# Patient Record
Sex: FEMALE | Race: WHITE | NOT HISPANIC OR LATINO | URBAN - METROPOLITAN AREA
[De-identification: names, ages, dates, MRNs, and addresses within clinical notes are randomized per-mention and may not be internally consistent; named-entity substitution may affect disease eponyms.]

---

## 2017-04-18 ENCOUNTER — APPOINTMENT (RX ONLY)
Dept: URBAN - METROPOLITAN AREA CLINIC 127 | Facility: CLINIC | Age: 72
Setting detail: DERMATOLOGY
End: 2017-04-18

## 2017-04-18 DIAGNOSIS — L81.4 OTHER MELANIN HYPERPIGMENTATION: ICD-10-CM

## 2017-04-18 DIAGNOSIS — R21 RASH AND OTHER NONSPECIFIC SKIN ERUPTION: ICD-10-CM

## 2017-04-18 PROBLEM — I10 ESSENTIAL (PRIMARY) HYPERTENSION: Status: ACTIVE | Noted: 2017-04-18

## 2017-04-18 PROBLEM — F41.9 ANXIETY DISORDER, UNSPECIFIED: Status: ACTIVE | Noted: 2017-04-18

## 2017-04-18 PROBLEM — H91.90 UNSPECIFIED HEARING LOSS, UNSPECIFIED EAR: Status: ACTIVE | Noted: 2017-04-18

## 2017-04-18 PROBLEM — E78.5 HYPERLIPIDEMIA, UNSPECIFIED: Status: ACTIVE | Noted: 2017-04-18

## 2017-04-18 PROBLEM — L23.7 ALLERGIC CONTACT DERMATITIS DUE TO PLANTS, EXCEPT FOOD: Status: ACTIVE | Noted: 2017-04-18

## 2017-04-18 PROBLEM — M12.9 ARTHROPATHY, UNSPECIFIED: Status: ACTIVE | Noted: 2017-04-18

## 2017-04-18 PROCEDURE — 99202 OFFICE O/P NEW SF 15 MIN: CPT

## 2017-04-18 PROCEDURE — ? PRESCRIPTION

## 2017-04-18 PROCEDURE — ? COUNSELING

## 2017-04-18 ASSESSMENT — LOCATION ZONE DERM
LOCATION ZONE: FACE
LOCATION ZONE: TRUNK
LOCATION ZONE: LIP

## 2017-04-18 ASSESSMENT — LOCATION SIMPLE DESCRIPTION DERM
LOCATION SIMPLE: LEFT CHEEK
LOCATION SIMPLE: RIGHT UPPER BACK
LOCATION SIMPLE: LEFT LIP

## 2017-04-18 ASSESSMENT — LOCATION DETAILED DESCRIPTION DERM
LOCATION DETAILED: LEFT INFERIOR MEDIAL MALAR CHEEK
LOCATION DETAILED: RIGHT SUPERIOR UPPER BACK
LOCATION DETAILED: LEFT UPPER CUTANEOUS LIP

## 2017-04-18 NOTE — HPI: RASH
How Severe Is Your Rash?: mild
Is This A New Presentation, Or A Follow-Up?: Rash
Additional History: Pt is leaving in 2 days to return up [place] so will not be able to follow up. She has tried not using her makeup but that has not made a difference.

## 2017-04-18 NOTE — PROCEDURE: MIPS QUALITY
Quality 130: Documentation Of Current Medications In The Medical Record: Current Medications Documented
Quality 226: Preventive Care And Screening: Tobacco Use: Screening And Cessation Intervention: Patient screened for tobacco and never smoked
Quality 110: Preventive Care And Screening: Influenza Immunization: Influenza Immunization previously received during influenza season
Quality 131: Pain Assessment And Follow-Up: Pain assessment using a standardized tool is documented as negative, no follow-up plan required
Detail Level: Detailed
Quality 47: Advance Care Plan: Advance Care Planning discussed and documented in the medical record; patient did not wish or was not able to name a surrogate decision maker or provide an advance care plan.
Quality 111:Pneumonia Vaccination Status For Older Adults: Pneumococcal Vaccination Previously Received

## 2017-04-23 RX ORDER — IODOQUINOL, HYDROCORTISONE ACETATE AND ALOE VERA LEAF 10; 20; 10 MG/G; MG/G; MG/G
GEL TOPICAL
Qty: 1 | Refills: 0 | Status: ERX | COMMUNITY
Start: 2017-04-23

## 2017-04-23 RX ADMIN — IODOQUINOL, HYDROCORTISONE ACETATE AND ALOE VERA LEAF: 10; 20; 10 GEL TOPICAL at 16:06

## 2020-10-27 ENCOUNTER — OFFICE VISIT (OUTPATIENT)
Dept: URBAN - METROPOLITAN AREA CLINIC 68 | Facility: CLINIC | Age: 75
End: 2020-10-27

## 2020-11-09 ENCOUNTER — OFFICE VISIT (OUTPATIENT)
Dept: URBAN - METROPOLITAN AREA SURGERY CENTER 12 | Facility: SURGERY CENTER | Age: 75
End: 2020-11-09

## 2021-07-29 ENCOUNTER — OFFICE VISIT (OUTPATIENT)
Dept: URBAN - METROPOLITAN AREA CLINIC 68 | Facility: CLINIC | Age: 76
End: 2021-07-29

## 2021-08-10 ENCOUNTER — OFFICE VISIT (OUTPATIENT)
Dept: URBAN - METROPOLITAN AREA SURGERY CENTER 12 | Facility: SURGERY CENTER | Age: 76
End: 2021-08-10

## 2022-06-04 ENCOUNTER — TELEPHONE ENCOUNTER (OUTPATIENT)
Dept: URBAN - METROPOLITAN AREA CLINIC 68 | Facility: CLINIC | Age: 77
End: 2022-06-04

## 2022-06-04 RX ORDER — PITAVASTATIN CALCIUM 2.09 MG/1
LIVALO( 2MG ORAL  DAILY ) INACTIVE -HX ENTRY TABLET, FILM COATED ORAL DAILY
OUTPATIENT
Start: 2020-10-27

## 2022-06-04 RX ORDER — B-COMPLEX WITH VITAMIN C
VITAMIN B COMPLEX(  ORAL  DAILY ) INACTIVE -HX ENTRY TABLET ORAL DAILY
OUTPATIENT
Start: 2015-06-11

## 2022-06-04 RX ORDER — POLYETHYLENE GLYOCOL 3350, SODIUM CHLORIDE, SODIUM BICARBONATE AND POTASSIUM CHLORIDE 420; 11.2; 5.72; 1.48 G/4L; G/4L; G/4L; G/4L
POWDER, FOR SOLUTION NASOGASTRIC; ORAL AS DIRECTED
Qty: 1 | Refills: 0 | OUTPATIENT
Start: 2016-08-25 | End: 2016-08-26

## 2022-06-04 RX ORDER — SODIUM SULFATE, POTASSIUM SULFATE, MAGNESIUM SULFATE 17.5; 3.13; 1.6 G/ML; G/ML; G/ML
SOLUTION, CONCENTRATE ORAL AS DIRECTED
Qty: 1 | Refills: 0 | OUTPATIENT
Start: 2021-07-29 | End: 2021-07-30

## 2022-06-04 RX ORDER — ESTROGENS, CONJUGATED 0.3 MG/1
PREMARIN( 0.3MG ORAL  DAILY ) INACTIVE -HX ENTRY TABLET, FILM COATED ORAL DAILY
OUTPATIENT
Start: 2015-06-11

## 2022-06-05 ENCOUNTER — TELEPHONE ENCOUNTER (OUTPATIENT)
Dept: URBAN - METROPOLITAN AREA CLINIC 68 | Facility: CLINIC | Age: 77
End: 2022-06-05

## 2022-06-05 RX ORDER — METOPROLOL SUCCINATE 50 MG/1
TOPROL XL( 50MG ORAL  DAILY ) ACTIVE -HX ENTRY TABLET, EXTENDED RELEASE ORAL DAILY
Status: ACTIVE | COMMUNITY
Start: 2021-07-29

## 2022-06-05 RX ORDER — ESOMEPRAZOLE MAGNESIUM 40 MG
NEXIUM( 40MG ORAL  AT BEDTIME ) ACTIVE -HX ENTRY CAPSULE,DELAYED RELEASE (ENTERIC COATED) ORAL AT BEDTIME
Status: ACTIVE | COMMUNITY
Start: 2021-07-29

## 2022-06-05 RX ORDER — LEVOTHYROXINE SODIUM 88 UG/1
SYNTHROID( 88MCG ORAL  DAILY ) ACTIVE -HX ENTRY TABLET ORAL DAILY
Status: ACTIVE | COMMUNITY
Start: 2021-07-29

## 2022-06-05 RX ORDER — LOSARTAN POTASSIUM 100 MG/1
LOSARTAN POTASSIUM( 100MG ORAL  DAILY ) ACTIVE -HX ENTRY TABLET ORAL DAILY
Status: ACTIVE | COMMUNITY
Start: 2021-07-29

## 2022-06-05 RX ORDER — AMLODIPINE BESYLATE 2.5 MG/1
AMLODIPINE BESYLATE( 2.5MG ORAL  DAILY ) ACTIVE -HX ENTRY TABLET ORAL DAILY
Status: ACTIVE | COMMUNITY
Start: 2021-07-29

## 2022-06-05 RX ORDER — DULOXETINE HCL 20 MG
CYMBALTA( 20MG ORAL  TWO TIMES DAILY ) ACTIVE -HX ENTRY CAPSULE,DELAYED RELEASE (ENTERIC COATED) ORAL
Status: ACTIVE | COMMUNITY
Start: 2021-07-29

## 2022-06-25 ENCOUNTER — TELEPHONE ENCOUNTER (OUTPATIENT)
Age: 77
End: 2022-06-25

## 2022-06-25 RX ORDER — ESTROGENS, CONJUGATED 0.3 MG/1
PREMARIN( 0.3MG ORAL  DAILY ) INACTIVE -HX ENTRY TABLET, FILM COATED ORAL DAILY
OUTPATIENT
Start: 2015-06-11

## 2022-06-25 RX ORDER — CHOLECALCIFEROL (VITAMIN D3) 25 MCG
VITAMIN D( 1000UNIT ORAL  DAILY ) INACTIVE -HX ENTRY TABLET ORAL DAILY
OUTPATIENT
Start: 2015-06-11

## 2022-06-25 RX ORDER — PITAVASTATIN CALCIUM 2.09 MG/1
LIVALO( 2MG ORAL  DAILY ) INACTIVE -HX ENTRY TABLET, FILM COATED ORAL DAILY
OUTPATIENT
Start: 2020-10-27

## 2022-06-25 RX ORDER — SODIUM SULFATE, POTASSIUM SULFATE, MAGNESIUM SULFATE 17.5; 3.13; 1.6 G/ML; G/ML; G/ML
SOLUTION, CONCENTRATE ORAL AS DIRECTED
Qty: 1 | Refills: 0 | OUTPATIENT
Start: 2016-09-01 | End: 2016-09-02

## 2022-06-25 RX ORDER — B-COMPLEX WITH VITAMIN C
VITAMIN B COMPLEX(  ORAL  DAILY ) INACTIVE -HX ENTRY TABLET ORAL DAILY
OUTPATIENT
Start: 2015-06-11

## 2022-06-25 RX ORDER — SODIUM SULFATE, POTASSIUM SULFATE, MAGNESIUM SULFATE 17.5; 3.13; 1.6 G/ML; G/ML; G/ML
SOLUTION, CONCENTRATE ORAL AS DIRECTED
Qty: 1 | Refills: 0 | OUTPATIENT
Start: 2021-07-29 | End: 2021-07-30

## 2022-06-26 ENCOUNTER — TELEPHONE ENCOUNTER (OUTPATIENT)
Age: 77
End: 2022-06-26

## 2022-06-26 RX ORDER — LEVOTHYROXINE SODIUM 88 MCG
SYNTHROID( 88MCG ORAL  DAILY ) ACTIVE -HX ENTRY TABLET ORAL DAILY
Status: ACTIVE | COMMUNITY
Start: 2021-07-29

## 2022-06-26 RX ORDER — DULOXETINE HCL 20 MG
CYMBALTA( 20MG ORAL  TWO TIMES DAILY ) ACTIVE -HX ENTRY CAPSULE,DELAYED RELEASE (ENTERIC COATED) ORAL
Status: ACTIVE | COMMUNITY
Start: 2021-07-29

## 2022-06-26 RX ORDER — AMLODIPINE BESYLATE 2.5 MG/1
AMLODIPINE BESYLATE( 2.5MG ORAL  DAILY ) ACTIVE -HX ENTRY TABLET ORAL DAILY
Status: ACTIVE | COMMUNITY
Start: 2021-07-29

## 2022-06-26 RX ORDER — METOPROLOL SUCCINATE 50 MG/1
TOPROL XL( 50MG ORAL  DAILY ) ACTIVE -HX ENTRY TABLET, EXTENDED RELEASE ORAL DAILY
Status: ACTIVE | COMMUNITY
Start: 2021-07-29

## 2022-06-26 RX ORDER — ASPIRIN 81 MG/1
ASPIRIN( 81MG ORAL  DAILY ) ACTIVE -HX ENTRY TABLET, DELAYED RELEASE ORAL DAILY
Status: ACTIVE | COMMUNITY
Start: 2021-07-29

## 2022-06-26 RX ORDER — ESOMEPRAZOLE MAGNESIUM 40 MG
NEXIUM( 40MG ORAL  AT BEDTIME ) ACTIVE -HX ENTRY CAPSULE,DELAYED RELEASE (ENTERIC COATED) ORAL AT BEDTIME
Status: ACTIVE | COMMUNITY
Start: 2021-07-29

## 2022-06-26 RX ORDER — LOSARTAN POTASSIUM 100 MG/1
LOSARTAN POTASSIUM( 100MG ORAL  DAILY ) ACTIVE -HX ENTRY TABLET, FILM COATED ORAL DAILY
Status: ACTIVE | COMMUNITY
Start: 2021-07-29